# Patient Record
Sex: FEMALE | URBAN - METROPOLITAN AREA
[De-identification: names, ages, dates, MRNs, and addresses within clinical notes are randomized per-mention and may not be internally consistent; named-entity substitution may affect disease eponyms.]

---

## 2019-04-11 ENCOUNTER — IMPORTED ENCOUNTER (OUTPATIENT)
Dept: URBAN - METROPOLITAN AREA CLINIC 31 | Facility: CLINIC | Age: 59
End: 2019-04-11

## 2019-04-11 PROCEDURE — 99203 OFFICE O/P NEW LOW 30 MIN: CPT

## 2019-04-11 NOTE — PATIENT DISCUSSION
New onset classic migraine ( with visual aura). Will schedule MRI of brain given mid-age. Smoker  - discussed high risk of complications including stroke due to smoking and migraineF/u 1 month

## 2019-06-04 ENCOUNTER — IMPORTED ENCOUNTER (OUTPATIENT)
Dept: URBAN - METROPOLITAN AREA CLINIC 31 | Facility: CLINIC | Age: 59
End: 2019-06-04

## 2019-06-04 PROBLEM — G43.B0: Noted: 2019-06-04

## 2019-06-04 PROCEDURE — 99213 OFFICE O/P EST LOW 20 MIN: CPT

## 2019-06-04 PROCEDURE — 92015 DETERMINE REFRACTIVE STATE: CPT

## 2019-06-04 NOTE — PATIENT DISCUSSION
Opthalmic Migraine - Discussed opthalmic migraine auras and possibilty of avoiding triggers. Continue to follow with primary medical provider. final glasses given today. Return for an appointment in 1 year for comprehensive exam. with Dr. Jonathon Sandoval

## 2022-04-02 ASSESSMENT — VISUAL ACUITY
OD_CC: 20/25-1
OS_PH: SC 20/25
OS_CC: 20/40-2
OS_PH: SC 20/20
OU_SC: 20/40
OD_CC: 20/25-2
OS_CC: 20/40-1
OS_SC: 20/40
OD_SC: 20/70

## 2022-04-02 ASSESSMENT — TONOMETRY
OD_IOP_MMHG: 12
OS_IOP_MMHG: 15
OS_IOP_MMHG: 14
OD_IOP_MMHG: 14

## 2022-07-09 ENCOUNTER — TELEPHONE ENCOUNTER (OUTPATIENT)
Dept: URBAN - METROPOLITAN AREA CLINIC 121 | Facility: CLINIC | Age: 62
End: 2022-07-09

## 2022-07-10 ENCOUNTER — TELEPHONE ENCOUNTER (OUTPATIENT)
Dept: URBAN - METROPOLITAN AREA CLINIC 121 | Facility: CLINIC | Age: 62
End: 2022-07-10

## 2022-07-10 RX ORDER — SUCRALFATE 1 G/1
TAKE ONE TABLET PO FOUR TIMES DAILY FOR 10 DAYS TABLET ORAL
Refills: 0 | Status: ACTIVE | COMMUNITY
Start: 2015-04-01

## 2022-07-10 RX ORDER — LINACLOTIDE 145 UG/1
TAKE ONE CAPSULE PO ONCE A DAY CAPSULE, GELATIN COATED ORAL ONCE A DAY
Refills: 3 | Status: ACTIVE | COMMUNITY
Start: 2015-11-02

## 2022-07-10 RX ORDER — ALBUTEROL SULFATE 90 UG/1
AEROSOL, METERED RESPIRATORY (INHALATION)
Refills: 0 | Status: ACTIVE | COMMUNITY
Start: 2015-09-16

## 2022-07-10 RX ORDER — OMEPRAZOLE 40 MG/1
TAKE ONE CAPSULE PO ONCE DAILY 30-60 MINUTES BEFORE BREAKFAST CAPSULE, DELAYED RELEASE ORAL ONCE A DAY
Refills: 6 | Status: ACTIVE | COMMUNITY
Start: 2015-03-12

## 2022-07-10 RX ORDER — ATORVASTATIN CALCIUM 20 MG/1
TABLET, FILM COATED ORAL
Refills: 0 | Status: ACTIVE | COMMUNITY
Start: 2015-10-20

## 2022-07-10 RX ORDER — CITALOPRAM 20 MG/1
TABLET ORAL
Refills: 0 | Status: ACTIVE | COMMUNITY
Start: 2015-10-30